# Patient Record
Sex: MALE | Race: WHITE | NOT HISPANIC OR LATINO | URBAN - METROPOLITAN AREA
[De-identification: names, ages, dates, MRNs, and addresses within clinical notes are randomized per-mention and may not be internally consistent; named-entity substitution may affect disease eponyms.]

---

## 2020-01-06 PROBLEM — R19.5: Status: ACTIVE | Noted: 2020-01-06

## 2020-01-06 PROBLEM — R63.4 WEIGHT LOSS, ABNORMAL: Status: ACTIVE | Noted: 2020-01-06

## 2020-02-03 ENCOUNTER — OFFICE (OUTPATIENT)
Dept: URBAN - METROPOLITAN AREA CLINIC 33 | Facility: CLINIC | Age: 65
End: 2020-02-03

## 2020-02-03 VITALS
SYSTOLIC BLOOD PRESSURE: 165 MMHG | SYSTOLIC BLOOD PRESSURE: 169 MMHG | DIASTOLIC BLOOD PRESSURE: 98 MMHG | DIASTOLIC BLOOD PRESSURE: 101 MMHG | TEMPERATURE: 97.7 F | HEART RATE: 68 BPM | WEIGHT: 200 LBS | HEIGHT: 70 IN

## 2020-02-03 DIAGNOSIS — R19.5 OTHER FECAL ABNORMALITIES: ICD-10-CM

## 2020-02-03 DIAGNOSIS — R19.4 CHANGE IN BOWEL HABIT: ICD-10-CM

## 2020-02-03 PROCEDURE — 99244 OFF/OP CNSLTJ NEW/EST MOD 40: CPT

## 2020-02-03 NOTE — SERVICEHPINOTES
SARAH NOEL   is a   64   year old male who is being seen in consultation at the request of   SERAFIN MATTHEW   for loose stool. reports change in stools within the past year or so. BMs 1-2x/day, BSS type 5-7. Does have solid (BSS type 4) stool at times. Some urgency. Denies rectal bleeding or abdominal pain. A couple times had nocturnal BMs. Has lost 4-5lbs within the past year. Denies any family hx of colon cancer or IBD. Denies joint pain or rashes. Last colonoscopy in 2011 unremarkable.

## 2020-02-20 ENCOUNTER — OFFICE (OUTPATIENT)
Dept: URBAN - METROPOLITAN AREA CLINIC 100 | Facility: CLINIC | Age: 65
End: 2020-02-20

## 2020-02-20 ENCOUNTER — OFFICE (OUTPATIENT)
Dept: URBAN - METROPOLITAN AREA PATHOLOGY 17 | Facility: PATHOLOGY | Age: 65
End: 2020-02-20

## 2020-02-20 VITALS
SYSTOLIC BLOOD PRESSURE: 111 MMHG | OXYGEN SATURATION: 97 % | DIASTOLIC BLOOD PRESSURE: 73 MMHG | SYSTOLIC BLOOD PRESSURE: 116 MMHG | DIASTOLIC BLOOD PRESSURE: 84 MMHG | TEMPERATURE: 97.5 F | RESPIRATION RATE: 16 BRPM | SYSTOLIC BLOOD PRESSURE: 110 MMHG | WEIGHT: 193 LBS | RESPIRATION RATE: 12 BRPM | RESPIRATION RATE: 11 BRPM | RESPIRATION RATE: 8 BRPM | DIASTOLIC BLOOD PRESSURE: 78 MMHG | DIASTOLIC BLOOD PRESSURE: 74 MMHG | HEART RATE: 70 BPM | OXYGEN SATURATION: 100 % | HEART RATE: 69 BPM | OXYGEN SATURATION: 98 % | HEART RATE: 64 BPM | SYSTOLIC BLOOD PRESSURE: 143 MMHG | RESPIRATION RATE: 14 BRPM | SYSTOLIC BLOOD PRESSURE: 151 MMHG | HEART RATE: 65 BPM | HEIGHT: 70 IN | TEMPERATURE: 979 F | OXYGEN SATURATION: 99 % | DIASTOLIC BLOOD PRESSURE: 93 MMHG | DIASTOLIC BLOOD PRESSURE: 71 MMHG | SYSTOLIC BLOOD PRESSURE: 117 MMHG

## 2020-02-20 DIAGNOSIS — R19.7 DIARRHEA, UNSPECIFIED: ICD-10-CM

## 2020-02-20 DIAGNOSIS — K52.832 LYMPHOCYTIC COLITIS: ICD-10-CM

## 2020-02-20 PROBLEM — R63.4 WEIGHT LOSS, ABNORMAL: Status: ACTIVE | Noted: 2020-02-20

## 2020-02-20 PROCEDURE — 88305 TISSUE EXAM BY PATHOLOGIST: CPT

## 2020-02-20 PROCEDURE — 45380 COLONOSCOPY AND BIOPSY: CPT
